# Patient Record
Sex: FEMALE | Race: WHITE
[De-identification: names, ages, dates, MRNs, and addresses within clinical notes are randomized per-mention and may not be internally consistent; named-entity substitution may affect disease eponyms.]

---

## 2021-08-17 ENCOUNTER — HOSPITAL ENCOUNTER (EMERGENCY)
Dept: HOSPITAL 54 - ER | Age: 26
LOS: 1 days | Discharge: HOME | End: 2021-08-18
Payer: MEDICAID

## 2021-08-17 VITALS — BODY MASS INDEX: 24.16 KG/M2 | HEIGHT: 65 IN | WEIGHT: 145 LBS

## 2021-08-17 DIAGNOSIS — R10.13: ICD-10-CM

## 2021-08-17 DIAGNOSIS — R10.84: Primary | ICD-10-CM

## 2021-08-17 LAB
ALBUMIN SERPL BCP-MCNC: 3.9 G/DL (ref 3.4–5)
ALP SERPL-CCNC: 79 U/L (ref 46–116)
ALT SERPL W P-5'-P-CCNC: 13 U/L (ref 12–78)
AST SERPL W P-5'-P-CCNC: 15 U/L (ref 15–37)
BASOPHILS # BLD AUTO: 0 K/UL (ref 0–0.2)
BASOPHILS NFR BLD AUTO: 0.5 % (ref 0–2)
BASOPHILS NFR BLD MANUAL: 0 % (ref 0–2)
BILIRUB DIRECT SERPL-MCNC: 0.1 MG/DL (ref 0–0.2)
BILIRUB SERPL-MCNC: 0.4 MG/DL (ref 0.2–1)
BUN SERPL-MCNC: 7 MG/DL (ref 7–18)
CALCIUM SERPL-MCNC: 9 MG/DL (ref 8.5–10.1)
CHLORIDE SERPL-SCNC: 104 MMOL/L (ref 98–107)
CO2 SERPL-SCNC: 18 MMOL/L (ref 21–32)
COLOR UR: YELLOW
CREAT SERPL-MCNC: 0.7 MG/DL (ref 0.6–1.3)
EOSINOPHIL NFR BLD AUTO: 0.9 % (ref 0–6)
EOSINOPHIL NFR BLD MANUAL: 0 % (ref 0–4)
GLUCOSE SERPL-MCNC: 90 MG/DL (ref 74–106)
HCT VFR BLD AUTO: 32 % (ref 33–45)
HGB BLD-MCNC: 9.6 G/DL (ref 11.5–14.8)
LIPASE SERPL-CCNC: 98 U/L (ref 73–393)
LYMPHOCYTES NFR BLD AUTO: 2.7 K/UL (ref 0.8–4.8)
LYMPHOCYTES NFR BLD AUTO: 28.9 % (ref 20–44)
LYMPHOCYTES NFR BLD MANUAL: 26 % (ref 16–48)
MCHC RBC AUTO-ENTMCNC: 30 G/DL (ref 31–36)
MCV RBC AUTO: 60 FL (ref 82–100)
MONOCYTES NFR BLD AUTO: 0.5 K/UL (ref 0.1–1.3)
MONOCYTES NFR BLD AUTO: 5.7 % (ref 2–12)
MONOCYTES NFR BLD MANUAL: 7 % (ref 0–11)
NEUTROPHILS # BLD AUTO: 6 K/UL (ref 1.8–8.9)
NEUTROPHILS NFR BLD AUTO: 64 % (ref 43–81)
NEUTS SEG NFR BLD MANUAL: 67 % (ref 42–76)
PH UR STRIP: 8.5 [PH] (ref 5–8)
PLATELET # BLD AUTO: 373 K/UL (ref 150–450)
POTASSIUM SERPL-SCNC: 3.4 MMOL/L (ref 3.5–5.1)
PROT SERPL-MCNC: 7.7 G/DL (ref 6.4–8.2)
RBC # BLD AUTO: 5.29 MIL/UL (ref 4–5.2)
RBC #/AREA URNS HPF: (no result) /HPF (ref 0–2)
SODIUM SERPL-SCNC: 138 MMOL/L (ref 136–145)
UROBILINOGEN UR STRIP-MCNC: 0.2 EU/DL
WBC #/AREA URNS HPF: (no result) /HPF (ref 0–3)
WBC NRBC COR # BLD AUTO: 9.4 K/UL (ref 4.3–11)

## 2021-08-17 PROCEDURE — 96375 TX/PRO/DX INJ NEW DRUG ADDON: CPT

## 2021-08-17 PROCEDURE — 80048 BASIC METABOLIC PNL TOTAL CA: CPT

## 2021-08-17 PROCEDURE — 36415 COLL VENOUS BLD VENIPUNCTURE: CPT

## 2021-08-17 PROCEDURE — 83690 ASSAY OF LIPASE: CPT

## 2021-08-17 PROCEDURE — 81001 URINALYSIS AUTO W/SCOPE: CPT

## 2021-08-17 PROCEDURE — 85025 COMPLETE CBC W/AUTO DIFF WBC: CPT

## 2021-08-17 PROCEDURE — 99284 EMERGENCY DEPT VISIT MOD MDM: CPT

## 2021-08-17 PROCEDURE — 96374 THER/PROPH/DIAG INJ IV PUSH: CPT

## 2021-08-17 PROCEDURE — 84703 CHORIONIC GONADOTROPIN ASSAY: CPT

## 2021-08-17 PROCEDURE — 74176 CT ABD & PELVIS W/O CONTRAST: CPT

## 2021-08-17 PROCEDURE — 80076 HEPATIC FUNCTION PANEL: CPT

## 2021-08-17 PROCEDURE — 85007 BL SMEAR W/DIFF WBC COUNT: CPT

## 2021-08-17 PROCEDURE — 96361 HYDRATE IV INFUSION ADD-ON: CPT

## 2021-08-17 NOTE — NUR
[id number] FROM HOME FOR LOW MID ABD PAIN NON RADIATING, SINCE 2PM TODAYDENIES ANY 
URINARY SX, PT TO BED 3, AAOX4, DENIES ANY SOB/CP, NAD NOTED, VSS, PENDING ER 
PROVIDER LYLE

## 2021-08-18 VITALS — DIASTOLIC BLOOD PRESSURE: 64 MMHG | SYSTOLIC BLOOD PRESSURE: 145 MMHG
